# Patient Record
Sex: MALE | Race: WHITE | NOT HISPANIC OR LATINO | Employment: FULL TIME | URBAN - METROPOLITAN AREA
[De-identification: names, ages, dates, MRNs, and addresses within clinical notes are randomized per-mention and may not be internally consistent; named-entity substitution may affect disease eponyms.]

---

## 2022-11-12 PROBLEM — R74.02 ELEVATED SERUM LACTATE DEHYDROGENASE: Status: ACTIVE | Noted: 2022-11-12

## 2022-11-12 PROBLEM — E87.20 LACTIC ACIDEMIA: Status: ACTIVE | Noted: 2022-11-12

## 2022-11-12 PROBLEM — K52.9 COLITIS: Status: ACTIVE | Noted: 2022-11-12

## 2022-11-12 PROBLEM — M89.9 LYTIC BONE LESIONS ON XRAY: Status: ACTIVE | Noted: 2022-11-12

## 2022-11-13 PROBLEM — E87.1 HYPONATREMIA: Status: ACTIVE | Noted: 2022-11-13

## 2022-11-13 PROBLEM — E88.09 HYPOALBUMINEMIA: Status: ACTIVE | Noted: 2022-11-13

## 2022-11-13 PROBLEM — K52.9 COLITIS, ACUTE: Status: ACTIVE | Noted: 2022-11-13

## 2022-11-13 PROBLEM — E87.6 HYPOKALEMIA: Status: ACTIVE | Noted: 2022-11-13

## 2022-11-13 PROBLEM — M89.8X5 LYTIC BONE LESION OF HIP: Status: ACTIVE | Noted: 2022-11-13

## 2022-11-13 PROBLEM — E83.42 HYPOMAGNESEMIA: Status: ACTIVE | Noted: 2022-11-13

## 2022-11-13 PROBLEM — R19.7 DIARRHEA: Status: ACTIVE | Noted: 2022-11-13

## 2022-11-13 PROBLEM — R10.32 LEFT LOWER QUADRANT ABDOMINAL PAIN: Status: ACTIVE | Noted: 2022-11-13

## 2022-11-14 PROBLEM — D64.9 NORMOCYTIC ANEMIA: Status: ACTIVE | Noted: 2022-11-14

## 2022-11-14 PROBLEM — R00.1 BRADYCARDIA: Status: ACTIVE | Noted: 2022-11-14

## 2022-11-14 PROBLEM — E83.39 HYPOPHOSPHATEMIA: Status: ACTIVE | Noted: 2022-11-14

## 2022-12-08 ENCOUNTER — NURSE TRIAGE (OUTPATIENT)
Dept: ADMINISTRATIVE | Facility: CLINIC | Age: 52
End: 2022-12-08

## 2022-12-08 NOTE — TELEPHONE ENCOUNTER
Narayan Vera's mother calling states Narayan currently has colitis flare up. Denies bleeding. Last known  BM 3 days ago. Just drank castor oil.  Currently c/o abdominal pain radiating to bilateral sides of lower back that began at 0430 today. Advised per triage protocol to go to nearest ED now for physician eval. V/u.   Reason for Disposition   SEVERE abdominal pain (e.g., excruciating)    Additional Information   Negative: Passed out (i.e., fainted, collapsed and was not responding)   Negative: Shock suspected (e.g., cold/pale/clammy skin, too weak to stand, low BP, rapid pulse)   Negative: Sounds like a life-threatening emergency to the triager    Protocols used: Abdominal Pain - Male-A-OH

## 2023-01-13 PROBLEM — I95.9 HYPOTENSION, UNSPECIFIED: Status: ACTIVE | Noted: 2023-01-13

## 2023-01-13 PROBLEM — E78.00 HYPERCHOLESTEROLEMIA: Status: ACTIVE | Noted: 2023-01-13

## 2023-12-11 PROBLEM — F17.210 CIGARETTE SMOKER: Status: ACTIVE | Noted: 2023-12-11

## 2023-12-11 PROBLEM — M79.671 RIGHT FOOT PAIN: Status: ACTIVE | Noted: 2023-12-11

## 2023-12-11 PROBLEM — R80.1 PERSISTENT PROTEINURIA: Status: ACTIVE | Noted: 2023-12-11

## 2023-12-11 PROBLEM — R91.1 SOLITARY PULMONARY NODULE: Status: ACTIVE | Noted: 2023-12-11
